# Patient Record
Sex: FEMALE | Race: BLACK OR AFRICAN AMERICAN | Employment: UNEMPLOYED | URBAN - METROPOLITAN AREA
[De-identification: names, ages, dates, MRNs, and addresses within clinical notes are randomized per-mention and may not be internally consistent; named-entity substitution may affect disease eponyms.]

---
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
## 2018-10-04 RX ORDER — ALBUTEROL SULFATE 90 UG/1
2 AEROSOL, METERED RESPIRATORY (INHALATION) EVERY 6 HOURS PRN
Qty: 1 INHALER | Refills: 0 | Status: SHIPPED | OUTPATIENT
Start: 2018-10-04 | End: 2019-05-18 | Stop reason: SDUPTHER

## 2018-10-04 RX ORDER — MONTELUKAST SODIUM 10 MG/1
10 TABLET ORAL NIGHTLY
Qty: 30 TABLET | Refills: 0 | Status: SHIPPED | OUTPATIENT
Start: 2018-10-04 | End: 2018-10-12 | Stop reason: SDUPTHER

## 2018-10-12 ENCOUNTER — OFFICE VISIT (OUTPATIENT)
Dept: FAMILY MEDICINE CLINIC | Age: 18
End: 2018-10-12
Payer: COMMERCIAL

## 2018-10-12 VITALS
WEIGHT: 198.8 LBS | HEIGHT: 67 IN | DIASTOLIC BLOOD PRESSURE: 70 MMHG | TEMPERATURE: 99.5 F | SYSTOLIC BLOOD PRESSURE: 110 MMHG | HEART RATE: 78 BPM | BODY MASS INDEX: 31.2 KG/M2

## 2018-10-12 DIAGNOSIS — Z00.00 ROUTINE CHECK-UP: Primary | ICD-10-CM

## 2018-10-12 DIAGNOSIS — J30.1 SEASONAL ALLERGIC RHINITIS DUE TO POLLEN: ICD-10-CM

## 2018-10-12 DIAGNOSIS — J45.990 EXERCISE-INDUCED ASTHMA: ICD-10-CM

## 2018-10-12 PROCEDURE — 90620 MENB-4C VACCINE IM: CPT | Performed by: NURSE PRACTITIONER

## 2018-10-12 PROCEDURE — 90472 IMMUNIZATION ADMIN EACH ADD: CPT | Performed by: NURSE PRACTITIONER

## 2018-10-12 PROCEDURE — 90686 IIV4 VACC NO PRSV 0.5 ML IM: CPT | Performed by: NURSE PRACTITIONER

## 2018-10-12 PROCEDURE — 99395 PREV VISIT EST AGE 18-39: CPT | Performed by: NURSE PRACTITIONER

## 2018-10-12 PROCEDURE — 90471 IMMUNIZATION ADMIN: CPT | Performed by: NURSE PRACTITIONER

## 2018-10-12 RX ORDER — MONTELUKAST SODIUM 10 MG/1
10 TABLET ORAL NIGHTLY
Qty: 30 TABLET | Refills: 10 | Status: SHIPPED | OUTPATIENT
Start: 2018-10-12

## 2018-10-12 ASSESSMENT — PATIENT HEALTH QUESTIONNAIRE - PHQ9
SUM OF ALL RESPONSES TO PHQ9 QUESTIONS 1 & 2: 0
1. LITTLE INTEREST OR PLEASURE IN DOING THINGS: 0
SUM OF ALL RESPONSES TO PHQ QUESTIONS 1-9: 0
2. FEELING DOWN, DEPRESSED OR HOPELESS: 0
SUM OF ALL RESPONSES TO PHQ QUESTIONS 1-9: 0

## 2018-10-12 ASSESSMENT — ENCOUNTER SYMPTOMS
TROUBLE SWALLOWING: 0
ABDOMINAL PAIN: 0
DIARRHEA: 0
SORE THROAT: 0
BACK PAIN: 0
EYE ITCHING: 0
CONSTIPATION: 0
VOMITING: 0
EYE REDNESS: 0
NAUSEA: 0
COUGH: 0
SHORTNESS OF BREATH: 0
BLOOD IN STOOL: 0
CHEST TIGHTNESS: 0
RHINORRHEA: 0
EYE DISCHARGE: 0
EYE PAIN: 0
COLOR CHANGE: 0

## 2018-10-12 NOTE — PROGRESS NOTES
yes       ROS  Review of Systems   Constitutional: Negative for activity change, appetite change, chills, fatigue, fever and unexpected weight change. HENT: Negative for congestion, dental problem, ear discharge, ear pain, mouth sores, rhinorrhea, sore throat and trouble swallowing. Eyes: Negative for pain, discharge, redness and itching. Respiratory: Negative for cough, chest tightness and shortness of breath. H/o exercise induced asthma. Used albuterol inhaler with some activities and it is sparingly. Also uses singulair to control allergy symptoms- works well and she denies SE. Cardiovascular: Negative for chest pain and palpitations. Gastrointestinal: Negative for abdominal pain, blood in stool, constipation, diarrhea, nausea and vomiting. Endocrine: Negative for polydipsia, polyphagia and polyuria. Genitourinary: Negative for dysuria, enuresis and genital sores. Musculoskeletal: Negative for back pain, gait problem and joint swelling. Skin: Negative for color change and rash. Allergic/Immunologic: Negative for environmental allergies, food allergies and immunocompromised state. Neurological: Negative for dizziness, seizures, syncope, speech difficulty, weakness, light-headedness, numbness and headaches. Hematological: Negative for adenopathy. Does not bruise/bleed easily. Psychiatric/Behavioral: Negative for behavioral problems, confusion, decreased concentration, dysphoric mood and suicidal ideas. The patient is not nervous/anxious. Current Outpatient Prescriptions on File Prior to Visit   Medication Sig Dispense Refill    albuterol sulfate HFA (VENTOLIN HFA) 108 (90 Base) MCG/ACT inhaler Inhale 2 puffs into the lungs every 6 hours as needed for Wheezing or Shortness of Breath 1 Inhaler 0     No current facility-administered medications on file prior to visit.         Allergies   Allergen Reactions    Cat Hair Extract     Other      Mold & Mildew  Mold & Mildew Years and older, IM (Fluzone 3 yrs and older or Afluria 5 yrs and older) 10/13/2016    Influenza, Dayna Katayama, 3 yrs and older, IM, PF (Fluzone 3 yrs and older or Afluria 5 yrs and older) 10/10/2017, 10/12/2018    MMR 10/11/1999, 08/18/2003    Meningococcal B, OMV (Bexsero) 10/12/2018    Meningococcal MCV4P (Menactra) 11/19/2009, 11/12/2014    Varicella (Varivax) 1998, 10/09/2006          DIAGNOSIS     Diagnosis Orders   1. Routine check-up     2. Seasonal allergic rhinitis due to pollen     3. Exercise-induced asthma         ASSESSMENT & PLAN  1. 1. 19 Year Well Visit-following along nicely on growth curves and developing well without behavioral concerns. Discussed the importance of monthly breast/testicular self exams. Advised about abstinence and safe sex, as well as the dangers of peer pressure. Also, talked about the need for a well-balanced, healthy diet and regular exercise. Patient is to call with any questions or concerns. Anticipatory guidance reviewed: Written instructions given    Follow-up visit in 1 year for next well child visit or call sooner if needed. 2/3. New scripts for medications sent in. Will continue to monitor symptoms and recheck as needed. Orders Placed This Encounter   Procedures    INFLUENZA, QUADV, 3 YRS AND OLDER, IM, PF, PREFILL SYR OR SDV, 0.5ML (FLUZONE QUADV, PF)    Meningococcal B, OMV (age 6y-22y) IM (BEXSERO)         Patient Instructions       Anticipatory guidance:    From now on, you should have a yearly well visit or physical until you are 18-20 and transition to an adult doctor's office (every year, even if you don't need shots!)    Well vision care is generally covered as part of your covered health maintenance on their medical insurance. I recommend:  Dr. Brenda Stewart  [address], [address] [place] [address]     You should be getting regular dental exams every 6 months.   If you need a concern you. Where can you learn more? Go to [email or web address] and sign in to your [facility] account. Enter P072 in the [facility] box to learn more about \"Well Visit, Ages 25 to 48: Care Instructions. \"     If you do not have an account, please click on the \"Sign Up Now\" link. Current as of: May 16, 2017  Content Version: 11.7  © 3691-9706 [employer]. Care instructions adapted under license by [facility] ([facility]). If you have questions about a medical condition or this instruction, always ask your healthcare professional. [address] any warranty or liability for your use of this information.

## 2018-10-12 NOTE — PATIENT INSTRUCTIONS
Anticipatory guidance:    From now on, you should have a yearly well visit or physical until you are 18-20 and transition to an adult doctor's office (every year, even if you don't need shots!)    Well vision care is generally covered as part of your covered health maintenance on their medical insurance. I recommend:  Dr. Josie Howard  [address] [facility]  [address] [facility], [address]     You should be getting regular dental exams every 6 months. If you need a dentist, I recommend:     [address] [phone]  [address] [facility] Lorenzo buckner, [address]      It is important to perform monthly breast/testicular self exams. There is only one way to prevent pregnancy and sexually transmitted disease- that is abstinence. If you do not practice abstinence, then you must use safe sex practices: utilizing condoms with intercourse and female use of birth control methods. Depression may be a problem with some teens. If you feel helpless, hopeless, or feel like you would like to hurt yourself or end your life, please talk to an adult to get help. The National suicide prevention lifeline is 2 385 248 69 92. This is a very important time in your life for nutrition. Eating a well balanced, healthy diet (avoiding processed/fast food, preservatives and artificial sweeteners) is important. You are what you eat! You should not drink \"energy drinks\"! They contain dangerous amounts of chemicals that have caused heart attacks in some teens. Creatine and other high protein supplements must be taken with a lot of water - like a gallon a day! If not, you run the risk of developing kidney failure. Never take medications from friends or others that has not been prescribed for you.   Do not take opiod pain killers (Vicodin, percocet) unless you are in the hospital.  These are the gateway drug that lead to opioid addiction and heroin use and the epidemic that

## 2019-05-20 RX ORDER — MONTELUKAST SODIUM 10 MG/1
TABLET ORAL
Qty: 30 TABLET | Refills: 6 | Status: SHIPPED | OUTPATIENT
Start: 2019-05-20

## 2021-12-07 ENCOUNTER — HOSPITAL ENCOUNTER (EMERGENCY)
Age: 21
Discharge: HOME | End: 2021-12-07
Payer: COMMERCIAL

## 2021-12-07 ENCOUNTER — APPOINTMENT (OUTPATIENT)
Dept: GENERAL RADIOLOGY | Age: 21
End: 2021-12-07
Payer: COMMERCIAL

## 2021-12-07 VITALS
RESPIRATION RATE: 18 BRPM | OXYGEN SATURATION: 99 % | SYSTOLIC BLOOD PRESSURE: 121 MMHG | TEMPERATURE: 98.3 F | HEART RATE: 68 BPM | DIASTOLIC BLOOD PRESSURE: 74 MMHG

## 2021-12-07 DIAGNOSIS — S86.912A STRAIN OF LEFT KNEE, INITIAL ENCOUNTER: Primary | ICD-10-CM

## 2021-12-07 PROCEDURE — 73562 X-RAY EXAM OF KNEE 3: CPT

## 2021-12-07 PROCEDURE — 6370000000 HC RX 637 (ALT 250 FOR IP): Performed by: NURSE PRACTITIONER

## 2021-12-07 PROCEDURE — 99283 EMERGENCY DEPT VISIT LOW MDM: CPT

## 2021-12-07 RX ORDER — ACETAMINOPHEN 500 MG
1000 TABLET ORAL ONCE
Status: COMPLETED | OUTPATIENT
Start: 2021-12-07 | End: 2021-12-07

## 2021-12-07 RX ADMIN — ACETAMINOPHEN 1000 MG: 500 TABLET ORAL at 16:32

## 2021-12-07 ASSESSMENT — PAIN SCALES - GENERAL
PAINLEVEL_OUTOF10: 7
PAINLEVEL_OUTOF10: 7

## 2021-12-07 NOTE — ED PROVIDER NOTES
[address] [facility]        Pt Name: Piter Kirby  MRN: [id number]  [place] 2000  Date of evaluation: 12/7/2021  Provider: YAKELIN Hudson - CNP  PCP: Manuel Clay MD  Note Started: 4:24 PM EST       JOBY. I have evaluated this patient. My supervising physician was available for consultation. CHIEF COMPLAINT       Chief Complaint   Patient presents with    Knee Pain     Patient in with complaints of left knee pain. States it has been ongoing for a week. Pt has had prior inj to knee. 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 Chief Complaint: Left knee pain    Piter Kirby is a 19 y.o. female who presents with complaints of left knee pain for the past 3 weeks. 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 She notes the pain is worse with prolonged standing when she attempts to then flex the knee. Pain is also worse when bending the knees to climb stairs. She has a history of similar symptoms approximately 3 years ago and was diagnosed with a strain. She has been using Tylenol, ice, and heat therapy without complete relief of symptoms. She denies any obvious deformity, redness, swelling, open wounds, bleeding, paresthesias, weakness, hip pain, ankle pain, or calf pain. Denies being anticoagulated. LMP 12/5/2021. Nursing Notes were all reviewed and agreed with or any disagreements were addressed in the HPI. REVIEW OF SYSTEMS    (2-9 systems for level 4, 10 or more for level 5)     Review of Systems    Positives and Pertinent negatives as per HPI. Except as noted above in the ROS, all other systems were reviewed and negative.        PAST MEDICAL HISTORY   History reviewed. No pertinent past medical history. SURGICAL HISTORY   History reviewed. No pertinent surgical history. CURRENTMEDICATIONS       Previous Medications    MONTELUKAST (SINGULAIR) 10 MG TABLET    Take 1 tablet by mouth nightly    MONTELUKAST (SINGULAIR) 10 MG TABLET    take 1 tablet by mouth EVERY NIGHT    PROAIR  (90 BASE) MCG/ACT INHALER    inhale 2 puffs by mouth every 6 hours if needed for wheezing or shortness of breath         ALLERGIES     Cat hair extract, Ibuprofen, and Other    FAMILYHISTORY     History reviewed. No pertinent family history. SOCIAL HISTORY       Social History     Tobacco Use    Smoking status: Never Smoker    Smokeless tobacco: Never Used   Substance Use Topics    Alcohol use: No    Drug use: No       SCREENINGS             PHYSICAL EXAM    (up to 7 for level 4, 8 or more for level 5)     ED Triage Vitals [12/07/21 1613]   BP Temp Temp src Pulse Resp SpO2 Height Weight   121/74 98.3 °F (36.8 °C) -- 68 18 99 % -- --       Physical Exam  Vitals and nursing note reviewed. Constitutional:       General: She is awake. Appearance: Normal appearance. She is well-developed. She is obese. HENT:      Head: Normocephalic and atraumatic. Nose: Nose normal.   Eyes:      General:         Right eye: No discharge. Left eye: No discharge. Cardiovascular:      Pulses:           Dorsalis pedis pulses are 2+ on the right side and 2+ on the left side. Pulmonary:      Effort: Pulmonary effort is normal. No respiratory distress. Musculoskeletal:         General: Normal range of motion. Cervical back: Normal range of motion. Right upper leg: Normal.      Left upper leg: Normal.      Left knee: No swelling, deformity or bony tenderness. Normal range of motion. No tenderness. No LCL laxity or MCL laxity. Instability Tests: Anterior drawer test negative. Posterior drawer test negative.       Right lower leg: Normal. No edema. Left lower leg: Normal. No edema. Right ankle: Normal.      Right Achilles Tendon: Normal.      Left ankle: Normal.      Left Achilles Tendon: Normal.   Skin:     General: Skin is warm and dry. Coloration: Skin is not pale. Neurological:      General: No focal deficit present. Mental Status: She is alert and oriented to person, place, and time. Sensory: Sensation is intact. Coordination: Coordination is intact. Gait: Gait is intact. Psychiatric:         Behavior: Behavior normal. Behavior is cooperative. DIAGNOSTIC RESULTS   LABS:    Labs Reviewed - No data to display    When ordered only abnormal lab results are displayed. All other labs were within normal range or not returned as of this dictation. EKG: When ordered, EKG's are interpreted by the Emergency Department Physician in the absence of a cardiologist.  Please see their note for interpretation of EKG. RADIOLOGY:   Non-plain film images such as CT, Ultrasound and MRI are read by the radiologist. Plain radiographic images are visualized and preliminarily interpreted by the ED Provider with the below findings:        Interpretation per the Radiologist below, if available at the time of this note:    XR KNEE LEFT (3 VIEWS)    (Results Pending)     No results found. PROCEDURES   Unless otherwise noted below, none     Procedures    CRITICAL CARE TIME   N/A    CONSULTS:  None      EMERGENCY DEPARTMENT COURSE and DIFFERENTIAL DIAGNOSIS/MDM:   Vitals:    Vitals:    12/07/21 1613   BP: 121/74   Pulse: 68   Resp: 18   Temp: 98.3 °F (36.8 °C)   SpO2: 99%       Patient was given the following medications:  Medications   acetaminophen (TYLENOL) tablet 1,000 mg (1,000 mg Oral Given 12/7/21 1632)           Care of this patient took place during the COVID-19 pandemic emergency. ED COURSE & MEDICAL DECISION MAKING    - The patient presented to the ER with complaints of left knee pain.  Vital signs were reviewed. Exam well-developed, well-nourished female who appears uncomfortable. Peripheral IV placed. Labs, Imaging ordered. - Pertinent Labs & Imaging studies reviewed. (See chart for details)   -  Patient seen and evaluated in the emergency department. -  Triage and nursing notes reviewed and incorporated. -  Old chart records reviewed and incorporated. -  JOBY. I have evaluated this patient. My supervising physician was available for consultation.  -  Differential diagnosis includes: sprain, strain, fracture, dislocation, contusion, abrasion, laceration, bursitis, baker's cyst, vs COVID. -  Work-up included:  See above  -  ED treatment included:  ice, tylenol  -  Results discussed with patient. 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 Pain is worse with prolonged standing and warm walking. She has used Tylenol with ice and heat without relief of symptoms. History of similar symptoms approximately 3 years ago when diagnosed with a knee strain. On exam she does not have any bony abnormalities. Neurovascular status intact. X-ray left knee shows no acute fracture. Slightly lateral position the patella may be projectional.  Correlate clinically to include subluxation. Patient has complete range of motion without any abnormality of the patella. She was given strict return discharge instructions. Ace wrap applied to the knee. Instructed on home treatment and care with RICE. Shared decision making patient stable for discharge at this time. FINAL IMPRESSION      1.  Strain of left knee, initial encounter          DISPOSITION/PLAN   DISPOSITION        PATIENT REFERRED TO:  Rose Hernandez[address] [phone]    Call in 2 days  As needed, If symptoms worsen    [facility] Emergency Department  [place] [address] [address] [facility] [address] [phone]  Go to   As needed      DISCHARGE MEDICATIONS:  New Prescriptions    No medications on file       DISCONTINUED MEDICATIONS:  Discontinued Medications    No medications on file              (Please note that portions of this note were completed with a voice recognition program.  Efforts were made to edit the dictations but occasionally words are mis-transcribed.)    YAKELIN Jeff CNP (electronically signed)            YAKELIN Jeff CNP  12/08/21 [id number]

## 2025-03-13 ENCOUNTER — OFFICE VISIT (OUTPATIENT)
Age: 25
End: 2025-03-13

## 2025-03-13 VITALS
BODY MASS INDEX: 38.89 KG/M2 | HEART RATE: 98 BPM | HEIGHT: 67 IN | DIASTOLIC BLOOD PRESSURE: 70 MMHG | OXYGEN SATURATION: 100 % | WEIGHT: 247.8 LBS | SYSTOLIC BLOOD PRESSURE: 126 MMHG | TEMPERATURE: 99.2 F

## 2025-03-13 DIAGNOSIS — R50.9 FEVER, UNSPECIFIED FEVER CAUSE: ICD-10-CM

## 2025-03-13 DIAGNOSIS — Z75.8 DOES NOT HAVE PRIMARY CARE PROVIDER: ICD-10-CM

## 2025-03-13 DIAGNOSIS — R11.0 NAUSEA: ICD-10-CM

## 2025-03-13 DIAGNOSIS — U07.1 COVID-19: Primary | ICD-10-CM

## 2025-03-13 LAB
INFLUENZA A ANTIBODY: NORMAL
INFLUENZA B ANTIBODY: NORMAL
Lab: ABNORMAL
PERFORMING INSTRUMENT: ABNORMAL
QC PASS/FAIL: ABNORMAL
SARS-COV-2, POC: DETECTED

## 2025-03-13 RX ORDER — ONDANSETRON 4 MG/1
4 TABLET, ORALLY DISINTEGRATING ORAL EVERY 8 HOURS PRN
Qty: 9 TABLET | Refills: 0 | Status: SHIPPED | OUTPATIENT
Start: 2025-03-13

## 2025-03-13 ASSESSMENT — ENCOUNTER SYMPTOMS
NAUSEA: 1
DIARRHEA: 1
SORE THROAT: 1

## 2025-03-13 NOTE — PATIENT INSTRUCTIONS
Covid 19  COVID positive;  Flu negative;  For congestion and runny nose, I recommend Pseudoephedrine, Flonase (or other steroid nasal sprays), Zyrtec (or other antihistamines), Vicks vapor rub, and saline nasal spray. Get the pseudoephedrine from BEHIND the pharmacy counter. It does not need a prescription. Avoid this if you have a history of high blood pressure or heart conditions. Do not take other decongestants while on this medication.  For cough, I recommend  Rick's Vapor Rub.  You can also use cough drops, honey, and throat lozenges. I recommend 1-2 teaspoons of honey every hour for relief of throat irritation and coughing fits.  For sore throat, you can use throat sprays (Chloraseptic, Cepacol), sipping on warm beverages (tea with honey), ice cream, popsicles, sore throat lozenges, and warm salt water gargles.  For fever, aches/pains, you can take ibuprofen (Advil, Motrin) and acetaminophen (Tylenol) for fevers, aches, and pains, if needed. Do not take this if you have been told to avoid these medications.  For ear pain, I recommend warm compresses over the symptomatic ear(s) for 10-15 minutes, or a hot shower, followed by 1-2 minutes of massaging the area behind your ears and down the jaw-line to help with the ear congestion/ear pressure  Increase your fluid intake and get lots of rest.  Warm teas, humidifiers, nasal lavages, and sleeping in an inclined position are also helpful options that can lessen symptoms.  [place] diet (bananas, rice, applesauce and toast). Avoid fatty foods, carbonated beverages, chocolate, caffeine, mints, alcohol, citrus fruits/juices, oils, spicy foods, and acidic foods (such as tomato based foods/sauces). If diarrhea develops, do not use Imodium or Pepto-bismol to stop the diarrhea as it may prolong the illness. If pain becomes severe, or localizes to the lower right side of the abdomen, if dark-tary stools develop, if unresolving vomiting, if blood in noted in stool or vomit, or

## 2025-03-13 NOTE — PROGRESS NOTES
Attila Reyes (: 2000) is a 24 y.o. female, New patient, here for evaluation of the following chief complaint(s):  Fever (Chills,bodyaches,sorethroat started two days ago.)        ASSESSMENT/PLAN:    ICD-10-CM    1. COVID-19  U07.1       2. Fever, unspecified fever cause  R50.9 POCT Influenza A/B     POCT COVID-19, Antigen      3. Does not have primary care provider  Z75.8 Ambulatory referral to Family Practice      4. Nausea  R11.0 ondansetron (ZOFRAN-ODT) 4 MG disintegrating tablet          Covid 19  COVID positive;  Flu negative;  Symptoms include congestion, fever, nausea/vomiting, sore throat, with exam findings of congestion, pharyngeal erythema, post nasal drip, there is concern COVID  Low concern for bacterial etiology of symptoms given lack of purulent findings and current course of illness less than 10 days, respiratory distress, community acquired pneumonia, PE otitis media, strep pharyngitis, mastoiditis, cellulitis, abscess, facial cellulitis, retained foreign bodies within the ear canal, otic eczema, and ear trauma,  Recommended:  OTC Pseudoephedrine, Flonase, Zyrtec, and Saline nasal spray for congestion relief, advised to avoid with hx HTN or cardiac issues  Acetaminophen (Tylenol) and/or ibuprofen (Motrin, Advil) for aches/pains as needed, provided there are no contraindications  [place] diet, increased fluids, ginger candies for nausea/vomiting relief  Sipping on warm beverages (tea with honey), ice cream, popsicles, sore throat lozenges, Chloraseptic spray, warm salt water gargles for sore throat relief  Prescribed:   Discussed Paxlovid, Declined  Zofran for nausea/vomiting  Strict ED follow up instructions provided    Discussed PCP follow up for persisting or worsening symptoms, or to return to the clinic if unable to obtain PCP follow up for worsening symptoms.    The patient tolerated their visit well. The patient and/or the family were informed of the results of any tests, a

## 2025-03-19 ENCOUNTER — TELEPHONE (OUTPATIENT)
Dept: FAMILY MEDICINE CLINIC | Age: 25
End: 2025-03-19

## 2025-03-19 NOTE — TELEPHONE ENCOUNTER
Left message for patient to return phone call to the office to schedule a new patient appointment     Patient was referred to establish care by Urgent care on 3/13